# Patient Record
Sex: FEMALE | Race: BLACK OR AFRICAN AMERICAN | NOT HISPANIC OR LATINO | Employment: FULL TIME | URBAN - METROPOLITAN AREA
[De-identification: names, ages, dates, MRNs, and addresses within clinical notes are randomized per-mention and may not be internally consistent; named-entity substitution may affect disease eponyms.]

---

## 2019-01-02 ENCOUNTER — OFFICE VISIT (OUTPATIENT)
Dept: URGENT CARE | Facility: CLINIC | Age: 39
End: 2019-01-02
Payer: COMMERCIAL

## 2019-01-02 VITALS
OXYGEN SATURATION: 100 % | TEMPERATURE: 98.6 F | HEART RATE: 85 BPM | SYSTOLIC BLOOD PRESSURE: 94 MMHG | DIASTOLIC BLOOD PRESSURE: 60 MMHG | RESPIRATION RATE: 16 BRPM | WEIGHT: 111 LBS

## 2019-01-02 DIAGNOSIS — Z23 ENCOUNTER FOR IMMUNIZATION: Primary | ICD-10-CM

## 2019-01-02 PROCEDURE — 90471 IMMUNIZATION ADMIN: CPT

## 2019-01-02 PROCEDURE — 96372 THER/PROPH/DIAG INJ SC/IM: CPT | Performed by: FAMILY MEDICINE

## 2019-01-02 PROCEDURE — 90686 IIV4 VACC NO PRSV 0.5 ML IM: CPT

## 2019-01-02 PROCEDURE — 99211 OFF/OP EST MAY X REQ PHY/QHP: CPT

## 2021-06-07 ENCOUNTER — OFFICE VISIT (OUTPATIENT)
Dept: URGENT CARE | Facility: CLINIC | Age: 41
End: 2021-06-07
Payer: COMMERCIAL

## 2021-06-07 VITALS
HEIGHT: 61 IN | TEMPERATURE: 98.8 F | DIASTOLIC BLOOD PRESSURE: 74 MMHG | SYSTOLIC BLOOD PRESSURE: 120 MMHG | WEIGHT: 116.8 LBS | HEART RATE: 90 BPM | BODY MASS INDEX: 22.05 KG/M2 | OXYGEN SATURATION: 100 % | RESPIRATION RATE: 18 BRPM

## 2021-06-07 DIAGNOSIS — S46.812A STRAIN OF LEFT TRAPEZIUS MUSCLE, INITIAL ENCOUNTER: Primary | ICD-10-CM

## 2021-06-07 PROCEDURE — 99213 OFFICE O/P EST LOW 20 MIN: CPT | Performed by: FAMILY MEDICINE

## 2021-06-07 RX ORDER — CLONAZEPAM 0.5 MG/1
TABLET, ORALLY DISINTEGRATING ORAL
COMMUNITY
Start: 2021-05-15

## 2021-06-07 RX ORDER — TRAZODONE HYDROCHLORIDE 50 MG/1
50 TABLET ORAL
COMMUNITY

## 2021-06-07 RX ORDER — METHOCARBAMOL 500 MG/1
500 TABLET, FILM COATED ORAL 2 TIMES DAILY PRN
Qty: 10 TABLET | Refills: 0 | Status: SHIPPED | OUTPATIENT
Start: 2021-06-07

## 2021-06-07 NOTE — PROGRESS NOTES
[facility]        NAME: Maricarmen Donaldson is a 38 y o  female  : 1980    MRN: [id number]  DATE: 2021  TIME: 11:09 AM    Assessment and Plan   Strain of left trapezius muscle, initial encounter [S44 105M]  1  Strain of left trapezius muscle, initial encounter  methocarbamol (ROBAXIN) 500 mg tablet         Patient Instructions     Patient Instructions   1  Strain of left trapezius muscle   - rest and apply a heating pad to the site  - take Tylenol as needed for pain  - advised against NSAID use due to interaction with medications  - Robaxin prescribed to help w/ muscle strain/spasm, to be taken at bedtime as it causes drowsiness, not to be taken prior to driving or operating machinery   - instructed not to take Trazodone while using Robaxin due increased drowsiness effect   - may use a muscle rub such as Bengay or Icy Hot   - follow up w/ PCP for re-check in 3-5 days   - if symptoms persist despite treatment, worsen, or any new symptoms present, patient is to be seen in the ER  Follow up with PCP in 3-5 days  Proceed to  ER if symptoms worsen  Chief Complaint     Chief Complaint   Patient presents with    Back Pain     left side upper back pain x 1 week         History of Present Illness       37 yo female presents c/o pain in her left upper back/trapezius muscle which has been present x 1 week  She denies any known injury to the back, however does mention that she was working in her yard for 2 days prior to the pain starting  She denies any falls or recent MVAs  No neck pain  She is able to move her neck in full ROM  No swelling or bruising of the area  The pain does not radiate down the left arm  No numbness or weakness of the arm, but she does feel occasional tingling of the fingers  No chest pain or SOB  No left shoulder pain  She has been using [employer] patches over the area, and taking Ibuprofen and Tylenol as needed for the pain   She denies any history of pre-existing conditions or previous injuries of the neck or back  Review of Systems   Review of Systems   Constitutional: Negative  HENT: Negative  Eyes: Negative  Respiratory: Negative  Cardiovascular: Negative  Gastrointestinal: Negative  Musculoskeletal:        As noted in HPI   Skin: Negative  Allergic/Immunologic: Negative  Neurological: Negative  Hematological: Negative  Current Medications       Current Outpatient Medications:     clonazePAM (KlonoPIN) 0 5 MG disintegrating tablet, , Disp: , Rfl:     FLUoxetine HCl (PROZAC PO), Take by mouth Pt using 90 mgs daily  Per pt , Disp: , Rfl:     traZODone (DESYREL) 50 mg tablet, Take 50 mg by mouth daily at bedtime, Disp: , Rfl:     methocarbamol (ROBAXIN) 500 mg tablet, Take 1 tablet (500 mg total) by mouth 2 (two) times a day as needed for muscle spasms, Disp: 10 tablet, Rfl: 0    Current Allergies     Allergies as of 06/07/2021    (No Known Allergies)            The following portions of the patient's history were reviewed and updated as appropriate: allergies, current medications, past family history, past medical history, past social history, past surgical history and problem list      Past Medical History:   Diagnosis Date    Anxiety     Patient denies medical problems        Past Surgical History:   Procedure Laterality Date    NO PAST SURGERIES         History reviewed  No pertinent family history  Medications have been verified  Objective   /74 (BP Location: Right arm, Patient Position: Sitting, Cuff Size: Standard)   Pulse 90   Temp 98 8 °F (37 1 °C) (Tympanic)   Resp 18   Ht 5' 1" (1 549 m)   Wt 53 kg (116 lb 12 8 oz)   SpO2 100%   BMI 22 07 kg/m²   No LMP recorded  Physical Exam     Physical Exam  Vitals signs and nursing note reviewed  Constitutional:       General: She is awake  She is not in acute distress  Appearance: Normal appearance   She is well-developed, well-groomed and normal weight  She is not ill-appearing, toxic-appearing or diaphoretic  HENT:      Head: Normocephalic and atraumatic  Neck:      Musculoskeletal: Full passive range of motion without pain, normal range of motion and neck supple  Normal range of motion  No edema, erythema, neck rigidity, injury, pain with movement, torticollis, spinous process tenderness or muscular tenderness  Trachea: Trachea and phonation normal    Cardiovascular:      Rate and Rhythm: Normal rate and regular rhythm  Pulses: Normal pulses  Heart sounds: Normal heart sounds  Pulmonary:      Effort: Pulmonary effort is normal  No tachypnea, accessory muscle usage or respiratory distress  Breath sounds: Normal breath sounds and air entry  Musculoskeletal:      Comments: Back: no swelling, erythema, or bruising  There is mild tenderness to palpation of the left sided trapezius muscle  No spinal or paraspinal tenderness  Patient is able to move spine in full ROM, however complains of pain in L trapezius muscle with certain movements  Bilateral upper extremities w/ full ROM, strength and sensations intact  Patient complains of pain in L trapezius region w/ certain motions of the left shoulder  There is no L shoulder joint swelling, erythema, bruising, or tenderness to palpation  Lymphadenopathy:      Cervical: No cervical adenopathy  Skin:     General: Skin is warm and dry  Coloration: Skin is not pale  Neurological:      General: No focal deficit present  Mental Status: She is alert and oriented to person, place, and time  Mental status is at baseline  Psychiatric:         Attention and Perception: Attention and perception normal          Mood and Affect: Mood and affect normal          Speech: Speech normal          Behavior: Behavior normal  Behavior is cooperative  Thought Content:  Thought content normal          Cognition and Memory: Cognition and memory normal          Judgment: Judgment normal

## 2021-06-07 NOTE — PATIENT INSTRUCTIONS
1  Strain of left trapezius muscle   - rest and apply a heating pad to the site  - take Tylenol as needed for pain  - advised against NSAID use due to interaction with medications  - Robaxin prescribed to help w/ muscle strain/spasm, to be taken at bedtime as it causes drowsiness, not to be taken prior to driving or operating machinery   - instructed not to take Trazodone while using Robaxin due increased drowsiness effect   - may use a muscle rub such as Bengay or Icy Hot   - follow up w/ PCP for re-check in 3-5 days   - if symptoms persist despite treatment, worsen, or any new symptoms present, patient is to be seen in the ER  Please inform Rissa that her mammogram is normal and that she needs to repeat in 1 year. no

## 2025-03-07 ENCOUNTER — OFFICE VISIT (OUTPATIENT)
Dept: URGENT CARE | Facility: CLINIC | Age: 45
End: 2025-03-07

## 2025-03-07 VITALS
HEART RATE: 82 BPM | TEMPERATURE: 97.8 F | RESPIRATION RATE: 18 BRPM | OXYGEN SATURATION: 100 % | WEIGHT: 120 LBS | DIASTOLIC BLOOD PRESSURE: 80 MMHG | SYSTOLIC BLOOD PRESSURE: 126 MMHG | HEIGHT: 61 IN | BODY MASS INDEX: 22.66 KG/M2

## 2025-03-07 DIAGNOSIS — H57.89 EYE REDNESS: Primary | ICD-10-CM

## 2025-03-07 RX ORDER — TOBRAMYCIN AND DEXAMETHASONE 3; 1 MG/ML; MG/ML
1 SUSPENSION/ DROPS OPHTHALMIC
Qty: 5 ML | Refills: 0 | Status: SHIPPED | OUTPATIENT
Start: 2025-03-07 | End: 2025-03-12

## 2025-03-07 RX ORDER — DULOXETIN HYDROCHLORIDE 30 MG/1
CAPSULE, DELAYED RELEASE ORAL
COMMUNITY
Start: 2025-01-16

## 2025-03-07 RX ORDER — DULOXETIN HYDROCHLORIDE 60 MG/1
CAPSULE, DELAYED RELEASE ORAL
COMMUNITY
Start: 2024-12-25

## 2025-03-07 NOTE — PATIENT INSTRUCTIONS
Acute Conjunctivitis: possible allergic.   -Will send in tobradex as patient would like to try antibiotic eye drops instead of olopatadine. The dexamethasone will aid with the redness and irritation.   -Cool or warm compresses on the eyes for comfort   -Saline eye drops to flush discharge. Keep the drops in the fridge for a cooling effect.    -Avoid contact lenses until your symptoms improve  -Sunglasses can be worn for light sensitivity  -Frequent hand washing to avoid the spread  -Follow up with your Eye Doctor immediately if your sx worsen or persist. Red flag signs discussed.

## 2025-03-07 NOTE — PROGRESS NOTES
[facility]        NAME: Tere Sneed is a 44 y.o. female  : 1980    MRN: [id number]  DATE: 2025  TIME: 12:28 PM    Assessment and Plan   Eye redness [H57.89]  1. Eye redness  tobramycin-dexamethasone (TOBRADEX) ophthalmic suspension            Patient Instructions   Acute Conjunctivitis: possible allergic.   -Will send in tobradex as patient would like to try antibiotic eye drops instead of olopatadine. The dexamethasone will aid with the redness and irritation.   -Cool or warm compresses on the eyes for comfort   -Saline eye drops to flush discharge. Keep the drops in the fridge for a cooling effect.    -Avoid contact lenses until your symptoms improve  -Sunglasses can be worn for light sensitivity  -Frequent hand washing to avoid the spread  -Follow up with your Eye Doctor immediately if your sx worsen or persist. Red flag signs discussed.     Follow up with PCP in 3-5 days.  Proceed to  ER if symptoms worsen.    If tests have been performed at [facility], our office will contact you with results if changes need to be made to the care plan discussed with you at the visit.  You can review your full results on [facility].    Chief Complaint     Chief Complaint   Patient presents with    Conjunctivitis     Pt here  for left eye area  swelling pt states   it is days 3  the eye is red  and swelling, pain  2/10.  No meds used.          History of Present Illness       The patient presents today for concern for conjunctivitis of the left eye x 3 days.  She states that she has redness and irritation of the conjunctiva. The patient denies blurred vision. No fever or chills. No URI sx. No sick contacts.  No periorbital pain or swelling. No injury or trauma to the eye. No eye pain or vision changes. No tearing or foreign body sensation. No photophobia. She wears contacts but has been using her glasses.  No OTC measures          Review of Systems   Review of Systems   Constitutional:   Negative for activity change, appetite change, chills, diaphoresis, fatigue and fever.   HENT:  Negative for congestion, ear discharge, ear pain, facial swelling, hearing loss, postnasal drip, rhinorrhea, sinus pressure, sinus pain, sore throat, tinnitus, trouble swallowing and voice change.    Eyes:  Positive for redness. Negative for photophobia, pain, discharge, itching and visual disturbance.   Respiratory:  Negative for apnea, cough, chest tightness, shortness of breath, wheezing and stridor.    Cardiovascular:  Negative for chest pain, palpitations and leg swelling.   Gastrointestinal:  Negative for abdominal distention and abdominal pain.   Genitourinary:  Negative for decreased urine volume.   Musculoskeletal:  Negative for arthralgias, joint swelling, myalgias, neck pain and neck stiffness.   Skin:  Negative for rash.   Allergic/Immunologic: Negative for immunocompromised state.   Neurological:  Negative for dizziness, weakness, light-headedness, numbness and headaches.   Hematological:  Negative for adenopathy.         Current Medications       Current Outpatient Medications:     clonazePAM (KlonoPIN) 0.5 MG disintegrating tablet, , Disp: , Rfl:     DULoxetine (CYMBALTA) 30 mg delayed release capsule, , Disp: , Rfl:     DULoxetine (CYMBALTA) 60 mg delayed release capsule, , Disp: , Rfl:     tobramycin-dexamethasone (TOBRADEX) ophthalmic suspension, Administer 1 drop into the left eye every 4 (four) hours while awake for 5 days, Disp: 5 mL, Rfl: 0    Current Allergies     Allergies as of 03/07/2025    (No Known Allergies)            The following portions of the patient's history were reviewed and updated as appropriate: allergies, current medications, past family history, past medical history, past social history, past surgical history and problem list.     Past Medical History:   Diagnosis Date    Anxiety     Patient denies medical problems        Past Surgical History:   Procedure Laterality Date     "BREAST BIOPSY Bilateral     Marker placement  in both    NO PAST SURGERIES         History reviewed. No pertinent family history.      Medications have been verified.        Objective   /80 (Patient Position: Sitting, Cuff Size: Standard)   Pulse 82   Temp 97.8 °F (36.6 °C) (Tympanic)   Resp 18   Ht 5' 1\" (1.549 m)   Wt 54.4 kg (120 lb)   SpO2 100%   BMI 22.67 kg/m²   No LMP recorded.       Physical Exam     Physical Exam  Vitals and nursing note reviewed.   Constitutional:       General: She is not in acute distress.     Appearance: She is well-developed. She is not ill-appearing, toxic-appearing or diaphoretic.   HENT:      Head: Normocephalic and atraumatic.      Nose: Nose normal. No mucosal edema or rhinorrhea.      Right Sinus: No maxillary sinus tenderness or frontal sinus tenderness.      Left Sinus: No maxillary sinus tenderness or frontal sinus tenderness.      Mouth/Throat:      Pharynx: Uvula midline. No oropharyngeal exudate, posterior oropharyngeal erythema or uvula swelling.      Tonsils: No tonsillar abscesses.   Eyes:      General: Lids are normal. Vision grossly intact. Gaze aligned appropriately. No allergic shiner, visual field deficit or scleral icterus.        Right eye: No foreign body, discharge or hordeolum.         Left eye: No foreign body, discharge or hordeolum.      Extraocular Movements:      Right eye: Normal extraocular motion and no nystagmus.      Left eye: Normal extraocular motion and no nystagmus.      Conjunctiva/sclera:      Right eye: Right conjunctiva is not injected. No chemosis, exudate or hemorrhage.     Left eye: Left conjunctiva is injected. No chemosis, exudate or hemorrhage.     Pupils: Pupils are equal, round, and reactive to light. Pupils are equal.      Right eye: Pupil is round, reactive and not sluggish.      Left eye: Pupil is round, reactive and not sluggish.   Cardiovascular:      Rate and Rhythm: Normal rate and regular rhythm.      Heart sounds: " Normal heart sounds, S1 normal and S2 normal. Heart sounds not distant. No murmur heard.     No friction rub. No gallop. No S3 or S4 sounds.   Pulmonary:      Effort: No tachypnea, bradypnea, accessory muscle usage or respiratory distress.      Breath sounds: Normal breath sounds and air entry. No stridor, decreased air movement or transmitted upper airway sounds. No decreased breath sounds, wheezing, rhonchi or rales.   Musculoskeletal:      Cervical back: Normal range of motion and neck supple.   Lymphadenopathy:      Cervical: No cervical adenopathy.   Neurological:      Mental Status: She is alert and oriented to person, place, and time.   Psychiatric:         Behavior: Behavior normal.